# Patient Record
Sex: FEMALE | Race: BLACK OR AFRICAN AMERICAN | Employment: UNEMPLOYED | URBAN - METROPOLITAN AREA
[De-identification: names, ages, dates, MRNs, and addresses within clinical notes are randomized per-mention and may not be internally consistent; named-entity substitution may affect disease eponyms.]

---

## 2020-05-18 ENCOUNTER — OFFICE VISIT (OUTPATIENT)
Dept: PRIMARY CARE CLINIC | Age: 30
End: 2020-05-18

## 2020-05-18 PROCEDURE — 99213 OFFICE O/P EST LOW 20 MIN: CPT | Performed by: NURSE PRACTITIONER

## 2020-05-19 LAB
SARS-COV-2: NOT DETECTED
SOURCE: NORMAL

## 2020-06-09 ENCOUNTER — OFFICE VISIT (OUTPATIENT)
Dept: PRIMARY CARE CLINIC | Age: 30
End: 2020-06-09

## 2020-06-09 PROCEDURE — 99213 OFFICE O/P EST LOW 20 MIN: CPT | Performed by: NURSE PRACTITIONER

## 2020-06-11 LAB
SARS-COV-2: NOT DETECTED
SOURCE: NORMAL

## 2020-08-26 LAB
C. TRACHOMATIS, EXTERNAL RESULT: NEGATIVE
HEP B, EXTERNAL RESULT: NEGATIVE
HEPATITIS C ANTIBODY, EXTERNAL RESULT: NEGATIVE
HIV, EXTERNAL RESULT: NEGATIVE
N. GONORRHOEAE, EXTERNAL RESULT: NEGATIVE
RUBELLA TITER, EXTERNAL RESULT: NORMAL

## 2021-03-29 LAB — GBS, EXTERNAL RESULT: NEGATIVE

## 2021-04-26 ENCOUNTER — ANESTHESIA (OUTPATIENT)
Dept: LABOR AND DELIVERY | Age: 31
End: 2021-04-26
Payer: COMMERCIAL

## 2021-04-26 ENCOUNTER — HOSPITAL ENCOUNTER (INPATIENT)
Age: 31
LOS: 3 days | Discharge: HOME | End: 2021-04-29
Attending: ADVANCED PRACTICE MIDWIFE | Admitting: ADVANCED PRACTICE MIDWIFE
Payer: COMMERCIAL

## 2021-04-26 ENCOUNTER — ANESTHESIA EVENT (OUTPATIENT)
Dept: LABOR AND DELIVERY | Age: 31
End: 2021-04-26
Payer: COMMERCIAL

## 2021-04-26 PROBLEM — Z34.90 ENCOUNTER FOR INDUCTION OF LABOR: Status: ACTIVE | Noted: 2021-04-26

## 2021-04-26 LAB
ABO/RH: NORMAL
ALBUMIN SERPL-MCNC: 3.5 G/DL (ref 3.4–5)
ALP BLD-CCNC: 340 U/L (ref 40–129)
ALT SERPL-CCNC: 23 U/L (ref 10–40)
AMPHETAMINE SCREEN, URINE: NORMAL
ANTIBODY SCREEN: NORMAL
AST SERPL-CCNC: 26 U/L (ref 15–37)
BARBITURATE SCREEN URINE: NORMAL
BASOPHILS ABSOLUTE: 0 K/UL (ref 0–0.2)
BASOPHILS RELATIVE PERCENT: 0.4 %
BENZODIAZEPINE SCREEN, URINE: NORMAL
BILIRUB SERPL-MCNC: 0.5 MG/DL (ref 0–1)
BILIRUBIN DIRECT: <0.2 MG/DL (ref 0–0.3)
BILIRUBIN, INDIRECT: ABNORMAL MG/DL (ref 0–1)
BUPRENORPHINE URINE: NORMAL
CANNABINOID SCREEN URINE: NORMAL
COCAINE METABOLITE SCREEN URINE: NORMAL
EOSINOPHILS ABSOLUTE: 0.1 K/UL (ref 0–0.6)
EOSINOPHILS RELATIVE PERCENT: 1 %
HCT VFR BLD CALC: 35.4 % (ref 36–48)
HEMOGLOBIN: 11.7 G/DL (ref 12–16)
LYMPHOCYTES ABSOLUTE: 1.6 K/UL (ref 1–5.1)
LYMPHOCYTES RELATIVE PERCENT: 15.8 %
Lab: NORMAL
MCH RBC QN AUTO: 27.3 PG (ref 26–34)
MCHC RBC AUTO-ENTMCNC: 33.1 G/DL (ref 31–36)
MCV RBC AUTO: 82.3 FL (ref 80–100)
METHADONE SCREEN, URINE: NORMAL
MONOCYTES ABSOLUTE: 0.8 K/UL (ref 0–1.3)
MONOCYTES RELATIVE PERCENT: 7.4 %
NEUTROPHILS ABSOLUTE: 7.8 K/UL (ref 1.7–7.7)
NEUTROPHILS RELATIVE PERCENT: 75.4 %
OPIATE SCREEN URINE: NORMAL
OXYCODONE URINE: NORMAL
PDW BLD-RTO: 14 % (ref 12.4–15.4)
PH UA: 6
PHENCYCLIDINE SCREEN URINE: NORMAL
PLATELET # BLD: 176 K/UL (ref 135–450)
PLATELET SLIDE REVIEW: ADEQUATE
PMV BLD AUTO: 9.6 FL (ref 5–10.5)
PROPOXYPHENE SCREEN: NORMAL
RBC # BLD: 4.31 M/UL (ref 4–5.2)
SARS-COV-2, NAAT: NOT DETECTED
SLIDE REVIEW: ABNORMAL
TOTAL PROTEIN: 7.2 G/DL (ref 6.4–8.2)
TOTAL SYPHILLIS IGG/IGM: NORMAL
WBC # BLD: 10.3 K/UL (ref 4–11)

## 2021-04-26 PROCEDURE — 85025 COMPLETE CBC W/AUTO DIFF WBC: CPT

## 2021-04-26 PROCEDURE — 2500000003 HC RX 250 WO HCPCS: Performed by: NURSE ANESTHETIST, CERTIFIED REGISTERED

## 2021-04-26 PROCEDURE — 80307 DRUG TEST PRSMV CHEM ANLYZR: CPT

## 2021-04-26 PROCEDURE — 3700000025 EPIDURAL BLOCK: Performed by: NURSE ANESTHETIST, CERTIFIED REGISTERED

## 2021-04-26 PROCEDURE — 87635 SARS-COV-2 COVID-19 AMP PRB: CPT

## 2021-04-26 PROCEDURE — 86900 BLOOD TYPING SEROLOGIC ABO: CPT

## 2021-04-26 PROCEDURE — 6360000002 HC RX W HCPCS

## 2021-04-26 PROCEDURE — 2580000003 HC RX 258: Performed by: ADVANCED PRACTICE MIDWIFE

## 2021-04-26 PROCEDURE — 86780 TREPONEMA PALLIDUM: CPT

## 2021-04-26 PROCEDURE — 2500000003 HC RX 250 WO HCPCS: Performed by: ANESTHESIOLOGY

## 2021-04-26 PROCEDURE — 86850 RBC ANTIBODY SCREEN: CPT

## 2021-04-26 PROCEDURE — 86901 BLOOD TYPING SEROLOGIC RH(D): CPT

## 2021-04-26 PROCEDURE — 80076 HEPATIC FUNCTION PANEL: CPT

## 2021-04-26 PROCEDURE — 3700000025 EPIDURAL BLOCK: Performed by: ANESTHESIOLOGY

## 2021-04-26 PROCEDURE — 59025 FETAL NON-STRESS TEST: CPT

## 2021-04-26 PROCEDURE — 1220000000 HC SEMI PRIVATE OB R&B

## 2021-04-26 RX ORDER — NALOXONE HYDROCHLORIDE 0.4 MG/ML
0.4 INJECTION, SOLUTION INTRAMUSCULAR; INTRAVENOUS; SUBCUTANEOUS PRN
Status: DISCONTINUED | OUTPATIENT
Start: 2021-04-26 | End: 2021-04-27

## 2021-04-26 RX ORDER — ONDANSETRON 2 MG/ML
4 INJECTION INTRAMUSCULAR; INTRAVENOUS EVERY 6 HOURS PRN
Status: DISCONTINUED | OUTPATIENT
Start: 2021-04-26 | End: 2021-04-27

## 2021-04-26 RX ORDER — SODIUM CHLORIDE 0.9 % (FLUSH) 0.9 %
10 SYRINGE (ML) INJECTION PRN
Status: DISCONTINUED | OUTPATIENT
Start: 2021-04-26 | End: 2021-04-27

## 2021-04-26 RX ORDER — SODIUM CHLORIDE 0.9 % (FLUSH) 0.9 %
10 SYRINGE (ML) INJECTION EVERY 12 HOURS SCHEDULED
Status: DISCONTINUED | OUTPATIENT
Start: 2021-04-26 | End: 2021-04-27

## 2021-04-26 RX ORDER — LIDOCAINE HYDROCHLORIDE AND EPINEPHRINE 15; 5 MG/ML; UG/ML
INJECTION, SOLUTION EPIDURAL PRN
Status: DISCONTINUED | OUTPATIENT
Start: 2021-04-26 | End: 2021-04-27 | Stop reason: SDUPTHER

## 2021-04-26 RX ORDER — SODIUM CHLORIDE, SODIUM LACTATE, POTASSIUM CHLORIDE, CALCIUM CHLORIDE 600; 310; 30; 20 MG/100ML; MG/100ML; MG/100ML; MG/100ML
INJECTION, SOLUTION INTRAVENOUS CONTINUOUS
Status: DISCONTINUED | OUTPATIENT
Start: 2021-04-26 | End: 2021-04-27

## 2021-04-26 RX ORDER — NALBUPHINE HCL 10 MG/ML
5 AMPUL (ML) INJECTION EVERY 4 HOURS PRN
Status: DISCONTINUED | OUTPATIENT
Start: 2021-04-26 | End: 2021-04-27

## 2021-04-26 RX ORDER — SODIUM CHLORIDE 9 MG/ML
25 INJECTION, SOLUTION INTRAVENOUS PRN
Status: DISCONTINUED | OUTPATIENT
Start: 2021-04-26 | End: 2021-04-27

## 2021-04-26 RX ORDER — DOCUSATE SODIUM 100 MG/1
100 CAPSULE, LIQUID FILLED ORAL 2 TIMES DAILY
Status: DISCONTINUED | OUTPATIENT
Start: 2021-04-26 | End: 2021-04-27

## 2021-04-26 RX ADMIN — LIDOCAINE HYDROCHLORIDE AND EPINEPHRINE 3 ML: 15; 5 INJECTION, SOLUTION EPIDURAL at 23:26

## 2021-04-26 RX ADMIN — Medication 30 UNITS: at 15:00

## 2021-04-26 RX ADMIN — Medication 4 ML: at 23:31

## 2021-04-26 RX ADMIN — Medication 4 ML: at 23:36

## 2021-04-26 RX ADMIN — SODIUM CHLORIDE, POTASSIUM CHLORIDE, SODIUM LACTATE AND CALCIUM CHLORIDE: 600; 310; 30; 20 INJECTION, SOLUTION INTRAVENOUS at 15:00

## 2021-04-26 RX ADMIN — Medication 10 ML/HR: at 23:41

## 2021-04-26 ASSESSMENT — PAIN DESCRIPTION - DESCRIPTORS: DESCRIPTORS: CRAMPING

## 2021-04-26 ASSESSMENT — ENCOUNTER SYMPTOMS: SHORTNESS OF BREATH: 0

## 2021-04-26 NOTE — ANESTHESIA PRE PROCEDURE
Department of Anesthesiology  Preprocedure Note       Name:  Yana Umana   Age:  31 y.o.  :  1990                                          MRN:  [id number]         Date:  2021        Procedure: Labor Epidural    Medications prior to admission:   Prior to Admission medications    Medication Sig Start Date End Date Taking?  Authorizing Provider   Prenatal Vit-Fe Fumarate-FA (PRENATAL VITAMIN PO) Take 1 tablet by mouth daily   Yes Historical Provider, MD   PEPPERMINT OIL PO Take 1 drop by mouth as needed (nausea)   Yes Historical Provider, MD       Current medications:    Current Facility-Administered Medications   Medication Dose Route Frequency Provider Last Rate Last Admin    lactated ringers infusion   Intravenous Continuous Janora Piggs, APRN -  mL/hr at 21 1500 New Bag at 21 1500    sodium chloride flush 0.9 % injection 10 mL  10 mL Intravenous 2 times per day Janora Piggs, APRN - CNM        sodium chloride flush 0.9 % injection 10 mL  10 mL Intravenous PRN Alexia Schwantes, APRN - CNM        0.9 % sodium chloride infusion  25 mL Intravenous PRN Alexia Schwantes, APRN - CNM        oxytocin (PITOCIN) 30 units in 500 mL infusion  10 Units Intravenous PRN Alexia Schwantes, APRN - CNM        And    oxytocin (PITOCIN) 30 units in 500 mL infusion  87.3 candy-units/min Intravenous Continuous PRN Alexia Schwantes, APRN - CNM        ondansetron (ZOFRAN) injection 4 mg  4 mg Intravenous Q6H PRN Alexia Schwantes, APRN - CNM        docusate sodium (COLACE) capsule 100 mg  100 mg Oral BID Alexia Schwantes, APRN - CNM        oxytocin (PITOCIN) 30 units in 500 mL infusion  1-20 candy-units/min Intravenous Continuous Alexia Schwantes, APRN - CNM 5 mL/hr at 21 1730 5 candy-units/min at 21 1730       Allergies:  No Known Allergies    Problem List:    Patient Active Problem List   Diagnosis Code    Encounter for induction of labor Z34.90       Past Medical History:        Diagnosis Date    Uterine fibroid        Past Surgical History:  History reviewed. No pertinent surgical history.     Social History:    Social History     Tobacco Use    Smoking status: Never Smoker    Smokeless tobacco: Never Used   Substance Use Topics    Alcohol use: Not Currently                                Counseling given: Not Answered      Vital Signs (Current):   Vitals:    04/26/21 1250 04/26/21 1339   BP: 118/75 120/77   Pulse: 90 88                                              BP Readings from Last 3 Encounters:   04/26/21 120/77       NPO Status:                                                                                 BMI:   Wt Readings from Last 3 Encounters:   No data found for Wt     There is no height or weight on file to calculate BMI.    CBC:   Lab Results   Component Value Date    WBC 10.3 04/26/2021    RBC 4.31 04/26/2021    HGB 11.7 04/26/2021    HCT 35.4 04/26/2021    MCV 82.3 04/26/2021    RDW 14.0 04/26/2021     04/26/2021       CMP:   Lab Results   Component Value Date    PROT 7.2 04/26/2021    BILITOT 0.5 04/26/2021    ALKPHOS 340 04/26/2021    AST 26 04/26/2021    ALT 23 04/26/2021       COVID-19 Screening (If Applicable):   Lab Results   Component Value Date    COVID19 Not Detected 06/09/2020           Anesthesia Evaluation  Patient summary reviewed and Nursing notes reviewed no history of anesthetic complications:   Airway: Mallampati: II  TM distance: >3 FB   Neck ROM: full  Mouth opening: > = 3 FB Dental: normal exam         Pulmonary:Negative Pulmonary ROS and normal exam  breath sounds clear to auscultation      (-) asthma, shortness of breath and recent URI                           Cardiovascular:Negative CV ROS  Exercise tolerance: good (>4 METS),       (-) hypertension and CAD      Rhythm: regular  Rate: normal                    Neuro/Psych:   Negative Neuro/Psych ROS              GI/Hepatic/Renal: Neg GI/Hepatic/Renal ROS  (+) GERD (TUMS PRN): well controlled,      (-) liver disease and no renal disease       Endo/Other: Negative Endo/Other ROS       (-) diabetes mellitus, hypothyroidism, hyperthyroidism                ROS comment: Uterine fibroids per chart review. Abdominal:           Vascular: negative vascular ROS. - DVT and PE. Anesthesia Plan      epidural     ASA 2     (Plan for a labor epidural. Plan and risks discussed with patient including but not limited to changes in HR, B/P and oxygen status; N/V; infection; headache; inadequate block or need to replace epidural. Questions answered. Patient agrees to POC.       )        Anesthetic plan and risks discussed with patient and spouse. OB History        1    Para        Term                AB        Living           SAB        TAB        Ectopic        Molar        Multiple        Live Births                    Karis Snyder is a 31y.o. year-old female admitted to [facility],  for IOL. Gestational age is 44w3d. She was seen, examined and her chart was reviewed (including anesthesia, drug and allergy history). No interval changes are noted to her history and physical examination. (except as noted above).     Risks/benefits/alternatives of both neuraxial and general anesthesia were discussed and she agrees to proceed at the direction of the care team.    CRAIG Rahman CRNA  2021  5:56 PM        CRAIG Rahman CRNA   2021

## 2021-04-26 NOTE — FLOWSHEET NOTE
Bedside report given to Ramona Avery RN. She is assuming care at this time. Pitocin infusion verified at 6mU/min.

## 2021-04-26 NOTE — H&P
Department of Obstetrics and Gynecology   Obstetrics History and Physical        CHIEF COMPLAINT:  abdominal pain, contractions    HISTORY OF PRESENT ILLNESS:    Yana Umana  is a 31 y.o.  female at 40w3d presents with a chief complaint as above and is being admitted for induction. She began 48hrs ago with RUQ pain that has come and gone. Worsens with physical activity and after eating. Pain has subsided throughout the day today. She also reports contractions daily that come and go. She denies N/V, headache, fever. Reports good fetal movement. Denies VB, LOF. Estimated Due Date: Estimated Date of Delivery: 21    PRENATAL CARE: Complicated by: none    PAST OB HISTORY:  OB History        1    Para        Term                AB        Living           SAB        TAB        Ectopic        Molar        Multiple        Live Births                  Past Medical History:        Diagnosis Date    Uterine fibroid      Past Surgical History:    History reviewed. No pertinent surgical history. Allergies:  Patient has no known allergies.   Social History:    Social History     Socioeconomic History    Marital status:      Spouse name: Not on file    Number of children: Not on file    Years of education: Not on file    Highest education level: Not on file   Occupational History    Not on file   Social Needs    Financial resource strain: Not on file    Food insecurity     Worry: Not on file     Inability: Not on file   [employer] needs     Medical: Not on file     Non-medical: Not on file   Tobacco Use    Smoking status: Never Smoker    Smokeless tobacco: Never Used   Substance and Sexual Activity    Alcohol use: Not Currently    Drug use: Never    Sexual activity: Yes     Partners: Male   Lifestyle    Physical activity     Days per week: Not on file     Minutes per session: Not on file    Stress: Not on file   Relationships    Social connections     Talks on phone: Not on file     Gets together: Not on file     Attends Sikhism service: Not on file     Active member of club or organization: Not on file     Attends meetings of clubs or organizations: Not on file     Relationship status: Not on file    Intimate partner violence     Fear of current or ex partner: Not on file     Emotionally abused: Not on file     Physically abused: Not on file     Forced sexual activity: Not on file   Other Topics Concern    Not on file   Social History Narrative    Not on file     Family History:       Problem Relation Age of Onset    Other Mother         Hypotension    Other Father         Car Accident    Other Sister         Uterine Fibroids     Medications Prior to Admission:  Medications Prior to Admission: Prenatal Vit-Fe Fumarate-FA (PRENATAL VITAMIN PO), Take 1 tablet by mouth daily  PEPPERMINT OIL PO, Take 1 drop by mouth as needed (nausea)    REVIEW OF SYSTEMS:  negative     PHYSICAL EXAM:    There were no vitals filed for this visit. General appearance:  awake, alert, cooperative, no apparent distress, and appears stated age  Neurologic:  Awake, alert, oriented to name, place and time.     Lungs:  No increased work of breathing, good air exchange  Abdomen:  Soft, non tender, gravid, fundal height consistent with the gestational age, EFW by Leopold's maneuvers is 3500 grams  Pelvis:  Adequate pelvis  Cervix: 1/60/-2  Contraction frequency: q3-6 min  Membranes:  Intact  Labs:   CBC:   Lab Results   Component Value Date    WBC 10.3 04/26/2021    RBC 4.31 04/26/2021    HGB 11.7 04/26/2021    HCT 35.4 04/26/2021    MCV 82.3 04/26/2021    MCH 27.3 04/26/2021    MCHC 33.1 04/26/2021    RDW 14.0 04/26/2021     04/26/2021    MPV 9.6 04/26/2021       ASSESSMENT:  <principal problem not specified>    PLAN: pitocin, Admit  Labor: Routine labor orders  Fetus: Reassuring  GBS: Negative    Electronically signed by CRAIG Duff CNM on 4/26/2021 at 3:01 PM

## 2021-04-26 NOTE — PROGRESS NOTES
Department of Obstetrics and Gynecology  Triage Evaluation Note    SUBJECTIVE:  Laila Ramos is a 31 y.o.,  at 40w3d who presents for RUQ pain for 48 hours. The pain is a burning sensation that radiates around to her back. Worsens with physical activity and after eating. She denies vaginal bleeding, leakage of fluid. Reports irregular contractions daily for the past week. She states the baby is moving well. She denies fever, shortness of breath, palpitations, nausea, vomiting, diarrhea on ROS. I personally reviewed the past medical and surgical histories, as well as the problem list.    OBJECTIVE:  Vital Signs wnl  Appearance/Psychiatric: alert and oriented X3  Constitutional: Appears well, no distress  Cardiovascular: She does not have edema. Respiratory:Respiratory effort is normal.  Gastrointestinal: soft, non tender, Gravid. The uterine size is equal to dates. Pelvic: Cervix 1/60/-2.    NST reactive and reassuring  [place]: contractions q 3-6 min    LABS:   Reviewed   WNL with exception of elevated alk phos    ASSESSMENT AND PLAN:  31 y.o.  at 44w3d presents with RUQ pain and contractions  Desires IOL   Re-evaluate pain after delivery and repeat labs to rule out gall bladder issues    Plan and labs reviewed with Dr. Mark Salazar         Electronically signed by CRAIG Trammell CNM on 2021 at 2:47 PM

## 2021-04-26 NOTE — FLOWSHEET NOTE
Patient admitted to triage with complain of RUQ abd pain. She reports that it started yesterday, it has been intermittent, it got worse overnight but has improved this morning. She states that it was almost gone but then she ate lunch and it came back again. She reports no visual disturbances, not nausea/vomiting, no headache, no other new symptoms. She is feeling the baby move. She reports no vaginal bleeding and no fluid leaking. FHT initiated, VSS.  at bedside, call light within reach. RN remains at bedside.

## 2021-04-27 ENCOUNTER — APPOINTMENT (OUTPATIENT)
Dept: CT IMAGING | Age: 31
End: 2021-04-27
Payer: COMMERCIAL

## 2021-04-27 LAB
HCT VFR BLD CALC: 26.7 % (ref 36–48)
HEMOGLOBIN: 8.7 G/DL (ref 12–16)
MCH RBC QN AUTO: 26.4 PG (ref 26–34)
MCHC RBC AUTO-ENTMCNC: 32.5 G/DL (ref 31–36)
MCV RBC AUTO: 81.3 FL (ref 80–100)
PDW BLD-RTO: 14.3 % (ref 12.4–15.4)
PLATELET # BLD: 143 K/UL (ref 135–450)
PMV BLD AUTO: 9.8 FL (ref 5–10.5)
RBC # BLD: 3.29 M/UL (ref 4–5.2)
WBC # BLD: 17.9 K/UL (ref 4–11)

## 2021-04-27 PROCEDURE — 85027 COMPLETE CBC AUTOMATED: CPT

## 2021-04-27 PROCEDURE — 0UQMXZZ REPAIR VULVA, EXTERNAL APPROACH: ICD-10-PCS | Performed by: OBSTETRICS & GYNECOLOGY

## 2021-04-27 PROCEDURE — 2580000003 HC RX 258: Performed by: ADVANCED PRACTICE MIDWIFE

## 2021-04-27 PROCEDURE — 6370000000 HC RX 637 (ALT 250 FOR IP): Performed by: OBSTETRICS & GYNECOLOGY

## 2021-04-27 PROCEDURE — 2500000003 HC RX 250 WO HCPCS: Performed by: NURSE ANESTHETIST, CERTIFIED REGISTERED

## 2021-04-27 PROCEDURE — 59025 FETAL NON-STRESS TEST: CPT

## 2021-04-27 PROCEDURE — 0DQR0ZZ REPAIR ANAL SPHINCTER, OPEN APPROACH: ICD-10-PCS | Performed by: OBSTETRICS & GYNECOLOGY

## 2021-04-27 PROCEDURE — 6370000000 HC RX 637 (ALT 250 FOR IP): Performed by: ADVANCED PRACTICE MIDWIFE

## 2021-04-27 PROCEDURE — 96375 TX/PRO/DX INJ NEW DRUG ADDON: CPT

## 2021-04-27 PROCEDURE — 6360000004 HC RX CONTRAST MEDICATION: Performed by: ADVANCED PRACTICE MIDWIFE

## 2021-04-27 PROCEDURE — 72193 CT PELVIS W/DYE: CPT

## 2021-04-27 PROCEDURE — 2580000003 HC RX 258: Performed by: OBSTETRICS & GYNECOLOGY

## 2021-04-27 PROCEDURE — 7200000001 HC VAGINAL DELIVERY

## 2021-04-27 PROCEDURE — 6360000002 HC RX W HCPCS: Performed by: ADVANCED PRACTICE MIDWIFE

## 2021-04-27 PROCEDURE — 1220000000 HC SEMI PRIVATE OB R&B

## 2021-04-27 PROCEDURE — 96374 THER/PROPH/DIAG INJ IV PUSH: CPT

## 2021-04-27 PROCEDURE — 6360000002 HC RX W HCPCS

## 2021-04-27 PROCEDURE — 2500000003 HC RX 250 WO HCPCS: Performed by: ANESTHESIOLOGY

## 2021-04-27 PROCEDURE — 51701 INSERT BLADDER CATHETER: CPT

## 2021-04-27 RX ORDER — CALCIUM CARBONATE 200(500)MG
1000 TABLET,CHEWABLE ORAL 3 TIMES DAILY PRN
Status: DISCONTINUED | OUTPATIENT
Start: 2021-04-27 | End: 2021-04-27

## 2021-04-27 RX ORDER — SODIUM CHLORIDE 9 MG/ML
25 INJECTION, SOLUTION INTRAVENOUS PRN
Status: DISCONTINUED | OUTPATIENT
Start: 2021-04-27 | End: 2021-04-29 | Stop reason: HOSPADM

## 2021-04-27 RX ORDER — ACETAMINOPHEN 500 MG
1000 TABLET ORAL ONCE
Status: DISCONTINUED | OUTPATIENT
Start: 2021-04-27 | End: 2021-04-27

## 2021-04-27 RX ORDER — POLYETHYLENE GLYCOL 3350 17 G/17G
17 POWDER, FOR SOLUTION ORAL DAILY
Status: DISCONTINUED | OUTPATIENT
Start: 2021-04-27 | End: 2021-04-29 | Stop reason: HOSPADM

## 2021-04-27 RX ORDER — SODIUM CHLORIDE 0.9 % (FLUSH) 0.9 %
5-40 SYRINGE (ML) INJECTION PRN
Status: DISCONTINUED | OUTPATIENT
Start: 2021-04-27 | End: 2021-04-29 | Stop reason: HOSPADM

## 2021-04-27 RX ORDER — LANOLIN 100 %
OINTMENT (GRAM) TOPICAL PRN
Status: DISCONTINUED | OUTPATIENT
Start: 2021-04-27 | End: 2021-04-29 | Stop reason: HOSPADM

## 2021-04-27 RX ORDER — ACETAMINOPHEN 325 MG/1
650 TABLET ORAL EVERY 4 HOURS PRN
Status: DISCONTINUED | OUTPATIENT
Start: 2021-04-27 | End: 2021-04-29 | Stop reason: HOSPADM

## 2021-04-27 RX ORDER — OXYCODONE HYDROCHLORIDE 5 MG/1
5 TABLET ORAL EVERY 6 HOURS PRN
Status: DISCONTINUED | OUTPATIENT
Start: 2021-04-27 | End: 2021-04-29 | Stop reason: HOSPADM

## 2021-04-27 RX ORDER — LIDOCAINE HYDROCHLORIDE AND EPINEPHRINE 20; 5 MG/ML; UG/ML
INJECTION, SOLUTION EPIDURAL; INFILTRATION; INTRACAUDAL; PERINEURAL PRN
Status: DISCONTINUED | OUTPATIENT
Start: 2021-04-27 | End: 2021-04-27 | Stop reason: SDUPTHER

## 2021-04-27 RX ORDER — IBUPROFEN 800 MG/1
800 TABLET ORAL EVERY 8 HOURS
Status: DISCONTINUED | OUTPATIENT
Start: 2021-04-28 | End: 2021-04-27

## 2021-04-27 RX ORDER — OXYCODONE HYDROCHLORIDE 5 MG/1
5 TABLET ORAL ONCE
Status: COMPLETED | OUTPATIENT
Start: 2021-04-27 | End: 2021-04-27

## 2021-04-27 RX ORDER — ONDANSETRON 4 MG/1
8 TABLET, ORALLY DISINTEGRATING ORAL EVERY 8 HOURS PRN
Status: DISCONTINUED | OUTPATIENT
Start: 2021-04-27 | End: 2021-04-29 | Stop reason: HOSPADM

## 2021-04-27 RX ORDER — SIMETHICONE 80 MG
80 TABLET,CHEWABLE ORAL EVERY 6 HOURS PRN
Status: DISCONTINUED | OUTPATIENT
Start: 2021-04-27 | End: 2021-04-29 | Stop reason: HOSPADM

## 2021-04-27 RX ORDER — SODIUM CHLORIDE 0.9 % (FLUSH) 0.9 %
5-40 SYRINGE (ML) INJECTION EVERY 12 HOURS SCHEDULED
Status: DISCONTINUED | OUTPATIENT
Start: 2021-04-27 | End: 2021-04-29 | Stop reason: HOSPADM

## 2021-04-27 RX ORDER — SODIUM CHLORIDE, SODIUM LACTATE, POTASSIUM CHLORIDE, CALCIUM CHLORIDE 600; 310; 30; 20 MG/100ML; MG/100ML; MG/100ML; MG/100ML
INJECTION, SOLUTION INTRAVENOUS CONTINUOUS
Status: DISCONTINUED | OUTPATIENT
Start: 2021-04-27 | End: 2021-04-29 | Stop reason: HOSPADM

## 2021-04-27 RX ORDER — ACETAMINOPHEN 325 MG/1
975 TABLET ORAL ONCE
Status: COMPLETED | OUTPATIENT
Start: 2021-04-27 | End: 2021-04-27

## 2021-04-27 RX ORDER — FAMOTIDINE 20 MG/1
20 TABLET, FILM COATED ORAL 2 TIMES DAILY
Status: DISCONTINUED | OUTPATIENT
Start: 2021-04-27 | End: 2021-04-29 | Stop reason: HOSPADM

## 2021-04-27 RX ORDER — IBUPROFEN 800 MG/1
800 TABLET ORAL EVERY 8 HOURS PRN
Status: DISCONTINUED | OUTPATIENT
Start: 2021-04-27 | End: 2021-04-29 | Stop reason: HOSPADM

## 2021-04-27 RX ORDER — SENNA AND DOCUSATE SODIUM 50; 8.6 MG/1; MG/1
2 TABLET, FILM COATED ORAL DAILY PRN
Status: DISCONTINUED | OUTPATIENT
Start: 2021-04-27 | End: 2021-04-28

## 2021-04-27 RX ADMIN — CEFAZOLIN SODIUM 2000 MG: 10 INJECTION, POWDER, FOR SOLUTION INTRAVENOUS at 15:44

## 2021-04-27 RX ADMIN — ANTACID TABLETS 1000 MG: 500 TABLET, CHEWABLE ORAL at 06:12

## 2021-04-27 RX ADMIN — SODIUM CHLORIDE, POTASSIUM CHLORIDE, SODIUM LACTATE AND CALCIUM CHLORIDE: 600; 310; 30; 20 INJECTION, SOLUTION INTRAVENOUS at 12:12

## 2021-04-27 RX ADMIN — OXYCODONE HYDROCHLORIDE 5 MG: 5 TABLET ORAL at 17:47

## 2021-04-27 RX ADMIN — Medication 10 ML: at 23:08

## 2021-04-27 RX ADMIN — OXYCODONE 5 MG: 5 TABLET ORAL at 23:07

## 2021-04-27 RX ADMIN — ACETAMINOPHEN 975 MG: 325 TABLET ORAL at 17:48

## 2021-04-27 RX ADMIN — LIDOCAINE HYDROCHLORIDE,EPINEPHRINE BITARTRATE 3 ML: 20; .005 INJECTION, SOLUTION EPIDURAL; INFILTRATION; INTRACAUDAL; PERINEURAL at 11:25

## 2021-04-27 RX ADMIN — IBUPROFEN 800 MG: 800 TABLET, FILM COATED ORAL at 15:49

## 2021-04-27 RX ADMIN — ONDANSETRON 4 MG: 2 INJECTION INTRAMUSCULAR; INTRAVENOUS at 10:25

## 2021-04-27 RX ADMIN — IOPAMIDOL 75 ML: 755 INJECTION, SOLUTION INTRAVENOUS at 19:52

## 2021-04-27 RX ADMIN — IBUPROFEN 800 MG: 800 TABLET, FILM COATED ORAL at 23:47

## 2021-04-27 RX ADMIN — LIDOCAINE HYDROCHLORIDE,EPINEPHRINE BITARTRATE 5 ML: 20; .005 INJECTION, SOLUTION EPIDURAL; INFILTRATION; INTRACAUDAL; PERINEURAL at 12:53

## 2021-04-27 RX ADMIN — SODIUM CHLORIDE, POTASSIUM CHLORIDE, SODIUM LACTATE AND CALCIUM CHLORIDE: 600; 310; 30; 20 INJECTION, SOLUTION INTRAVENOUS at 04:09

## 2021-04-27 RX ADMIN — OXYCODONE 5 MG: 5 TABLET ORAL at 16:40

## 2021-04-27 ASSESSMENT — PAIN DESCRIPTION - PAIN TYPE
TYPE: ACUTE PAIN
TYPE: ACUTE PAIN

## 2021-04-27 ASSESSMENT — PAIN SCALES - GENERAL
PAINLEVEL_OUTOF10: 7
PAINLEVEL_OUTOF10: 5
PAINLEVEL_OUTOF10: 9

## 2021-04-27 ASSESSMENT — PAIN DESCRIPTION - FREQUENCY
FREQUENCY: INTERMITTENT
FREQUENCY: CONTINUOUS

## 2021-04-27 ASSESSMENT — PAIN - FUNCTIONAL ASSESSMENT
PAIN_FUNCTIONAL_ASSESSMENT: ACTIVITIES ARE NOT PREVENTED
PAIN_FUNCTIONAL_ASSESSMENT: PREVENTS OR INTERFERES SOME ACTIVE ACTIVITIES AND ADLS

## 2021-04-27 ASSESSMENT — PAIN DESCRIPTION - ORIENTATION
ORIENTATION: LOWER
ORIENTATION: MID

## 2021-04-27 ASSESSMENT — PAIN DESCRIPTION - PROGRESSION
CLINICAL_PROGRESSION: NOT CHANGED
CLINICAL_PROGRESSION: NOT CHANGED

## 2021-04-27 ASSESSMENT — PAIN DESCRIPTION - ONSET
ONSET: ON-GOING
ONSET: ON-GOING

## 2021-04-27 ASSESSMENT — PAIN DESCRIPTION - LOCATION
LOCATION: PERINEUM
LOCATION: PERINEUM

## 2021-04-27 ASSESSMENT — PAIN DESCRIPTION - DESCRIPTORS
DESCRIPTORS: ACHING;SORE
DESCRIPTORS: ACHING;SORE

## 2021-04-27 NOTE — PROGRESS NOTES
S: Pt resting comfortably in bed. Report received from Reginold Bumpers, [place].    O: VSS, /75   Pulse 93   Temp 98 °F (36.7 °C) (Oral)   Resp 16         , moderate variability, no accels, no decels        SVE C/C/+2        UCs q 2 minutes, pit @ 10 mius   A: IOL       FHR Cat I   P: Prepare for pushing       Dr. Tigist Rockwell aware

## 2021-04-27 NOTE — PROGRESS NOTES
Patient complaining of perineal pain, rated at a 7-8 on pain scale. Received ibuprofen and Megan 5mg earlier without relief. Recently received Tylenol 1g and an additional Megan 5mg. On exam, repair intact. Perineum with mild edema. This may represent routine pain s/p third degree perineal laceration; however, with placement of forceps earlier and lacerations, hematoma considered. STAT CBC ordered.      Faustino Poot

## 2021-04-27 NOTE — FLOWSHEET NOTE
Pt called out, \"think my water broke\". Nini Dudley CNM at bedside to evaluate pt. Moderate amount of clear fluid noted on pad.  SVE 2-3/80/0

## 2021-04-27 NOTE — FLOWSHEET NOTE
Patient reports small improvement in pain and it is noted that she is longer shivering. BP rechecked in [place]. Notified Dr. Marisol Garzon of repeat BP of 134/72. Telephone order received to notify [facility] provider for two consecutive BP's of 160/110 or greater.

## 2021-04-27 NOTE — PROGRESS NOTES
Department of Obstetrics and Gynecology  Intrapartum Evaluation Note    Subjective:  Resting in bed, comfortable with epidural. Pit at 10mius. Repositioned to right lateral with peanut ball. Objective:  /60   Pulse 86   Temp 98.4 °F (36.9 °C) (Oral)   Resp 14   Cervix is 7/90/0   FHR tracing description: 120/mod/+accels, occasional early decels  FHR tracing category 2  [place]: contractions every 1.5-3.5 minutes      Assessment and Plan:   Active labor    Labor Plan: Continue pitocin titration   Anticipate         Electronically signed by CRAIG Trammell CNM on 2021 at 6:48 AM

## 2021-04-27 NOTE — L&D DELIVERY NOTE
VACUUM ASSISTED VAGINAL DELIVERY NOTE    Pre operative Diagnoses / Indications:   1. IUP at 40w4d  2. Induction of labor at term  3. Maternal exhaustion  Post operative Diagnoses: Same  Procedure: Vacuum-assisted vaginal delivery  Anesthesia: Epidural  Surgeon: Hailey Caldera MD  Assistant: ROSEY Enriquez  Position: OA  Station: +4/5  Living, viable male infant; weight 4570g; Apgars 4/8/9  Consent: The risks, benefits and alternatives were discussed with the patient including but not limited to maternal lacerations and fetal injury, and the patient agreed to procedure. Complications: shoulder dystocia lasting 2 minutes  Specimens: none  Gases sent: yes  QBL: 300 mL  Optimal cord clamping: no    Aicha Williamson is a 31 y.o.  at 40w4d who was admitted for induction of labor. She progressed to 10 cm and pushing commenced. After two hours of pushing, she had moved the baby to +4 station but reported extreme exhaustion. I was called to room by Thelma Woodson CNM, to assess for operative assisted vaginal delivery. On exam, cervix was complete/+4, MIKAELA, EFW: 4000g. The risks and benefits of forceps assisted vaginal delivery were discussed with the patient and she agreed to proceed with the planned procedure. We also discussed episiotomy because I was concerned she may require one due to thick, swollen perineal tissue encountered on her exam; she agreed to the procedure. The bladder was emptied and first blade placed. I attempted to place the second blade but was unable to articulate the blades. I removed both forceps and attempted placement one additional time. When I was again unable to articulate the forceps, I aborted the procedure and counseled the patient regarding vacuum assisted vaginal delivery as documented above. She consented to proceed with the procedure. A kiwi vacuum was placed approximately 2cm anterior to the posterior fornix.  The patient pushed with vacuum assistance through one contraction with one pop-off. Right mediolateral episiotomy was cut. Vacuum was replaced and the patient pushed through additional contraction for delivery of the fetal head in OA. The vacuum was removed. The fetal head restituted to face the maternal right. Cord delivered behind the fetal back and neck but was not wrapped around the fetus. The head did not deliver with gentle traction and a shoulder dystocia was diagnosed, the patient was instructed to stop pushing, and additional assistance was called to the room. The following maneuvers were employed to resolve the dystocia: maternal hips were hyperflexed; suprapubic pressure was applied; I attempted to delivery posterior arm but was unable to do so; pressure was applied to the back surface of the posterior fetal shoulder to rotate it; pressure was applied to the posterior surface of the anterior fetal shoulder to rotate it; the posterior fetal shoulder was encircled and traction was applied along the sacral curve until posterior shoulder was delivered. The anterior shoulder then delivered and the rest of the infant delivered easily. Total time of shoulder dystocia: 2 minutes. The infant was placed on the maternal abdomen, the cord was clamped and cut, and the infant was handed to the waiting pediatric staff. The placenta was removed and appeared intact. A laceration was noted along the right anterior vagina and this was repaired with 3-0 Vicryl. A third degree perineal laceration was noted and repaired using 2 - 0 Vicryl. A superficial laceration was noted on the surface of the right labia minora and this was repaired with 4-0 Vicryl. Ancef 2g was administered. The vagina and cervix were examined and found to be intact except as noted above. The pediatrician reported that baby was moving both arms equally and no evidence of injury. I discussed the delivery with patient and father of baby and all questions answered. Mother and baby doing well in [facility].     [facility]  April

## 2021-04-27 NOTE — PROGRESS NOTES
LATE ENTRY    Notified of several severe range BP earlier, most recent /102. Pt shaking at time of check. Recheck 134/72. Asked to be notified of additional severe range BP.      Vince Peres

## 2021-04-27 NOTE — ANESTHESIA POSTPROCEDURE EVALUATION
Department of Anesthesiology  Postprocedure Note    Patient: Janee Zimmerman  MRN: [id number]  [place]: 1990  Date of evaluation: 4/27/2021  Time:  3:26 PM     Procedure Summary     Date: 04/26/21 Room / Location:     Anesthesia Start: 2319 Anesthesia Stop: 04/27/21 1312    Procedure: Labor Analgesia Diagnosis:     Scheduled Providers:  Responsible Provider: Olga Lidia Viera MD    Anesthesia Type: epidural ASA Status: 2          Anesthesia Type: epidural    Joseline Phase I: Joseline Score: 9    Joseline Phase II:      Last vitals: Reviewed and per EMR flowsheets.        Anesthesia Post Evaluation    Patient location during evaluation: floor  Patient participation: complete - patient participated  Level of consciousness: awake and alert  Pain score: 0  Nausea & Vomiting: no nausea and no vomiting  Complications: no  Cardiovascular status: hemodynamically stable  Respiratory status: acceptable  Hydration status: stable

## 2021-04-27 NOTE — FLOWSHEET NOTE
Patient reporting pain has decreased to 5/10 which she says is tolerable. She is sitting up feeding the baby and states she would not have been able to do that earlier when she was a 9/10. Educated her to notify her nurse right away if it starts to get intolerable again. She verbalized understanding. She has ordered dinner and has no other needs at this time. FOB at bedside, call light within reach.

## 2021-04-27 NOTE — FLOWSHEET NOTE
Dr. Lexy Lee on unit notified of patients elevated BP's during recovery. Informed her patients pain complaint of pain, pain interventions recently completed, patients chills, etc.  Verbal order received to recheck in 15 minutes and notify her of result.

## 2021-04-27 NOTE — PROGRESS NOTES
Department of Obstetrics and Gynecology  Intrapartum Evaluation Note    Subjective:  Pt in shower. Managing pain of contractions well. Pitocin remains at 6mius. Objective:  /81   Pulse 67   Temp 98.3 °F (36.8 °C) (Oral)   Resp 20   Cervix is 2/80/0 at last exam following SROM.    FHR tracing description: 145/mod/+accels, no decels  FHR tracing category 1  [place]: contractions every 2-3 minutes      Assessment and Plan:  IOL at term   SROM- thin mec     Labor Plan: Continue pitocin titration         Electronically signed by CRAIG Kent CNM on 4/26/2021 at 10:43 PM

## 2021-04-27 NOTE — LACTATION NOTE
Lactation Progress Note  Initial Consult     Data: Referral received per RN.      Action: LC to room white board updated with name and number. Mother resting in bed. Infant awake at breast nursing. Mother states agreeable to consult from [facility] at this time.      Mother verbalized infant latching immediately at 0. Note sustained rhythmical sucks with swallows at this time. I reviewed Care Plan for First 24 Hours of Life already in patient binder.  Discussed recognizing hunger cues and offering the breast when cues are shown. Encouraged breastfeeding on demand and attempting/offering at least every 3 hours. Informed infant may have one 5 hour stretch of sleep in a 24 hour period.  Encouraged unlimited skin to skin contact with infant and reviewed benefits including better temperature, heart rate, respiration, blood pressure, and blood sugar regulation. Also increased bonding and milk supply associated with skin to skin contact. Discussed feeding positions, latch on techniques, signs of milk transfer, output goals and normal feeding/sleeping behaviors.  I referred mother to binder for additional information about breastfeeding and skin to skin contact.      Mother states goal for breastfeeding is one year.     Mother requesting LC to obtain a breast pump through [employer] insurance. I faxed a Prescription from her provider and health insurance information to Mom[employer] per mother's request.      Gave resources for reverse pressure softening and breastfeeding support after discharge. Provided a lactation consultant business card, directed mother to [employer] for evidence based information, and encouraged mother to call with any lactation needs.     Response:  Mother verbalizes understanding of information given and denies further needs at this time.

## 2021-04-27 NOTE — ANESTHESIA PROCEDURE NOTES
Difficulties/Complications: None    The patient was returned to the supine position with her head of bed elevated 30 degrees and right uterine displacement. She tolerated the epidural placement and dosing well. RN remains at bedside to monitor patient.       Keisha Ruth APRN - CRNA  11:45 PM

## 2021-04-28 LAB
HCT VFR BLD CALC: 23.1 % (ref 36–48)
HEMOGLOBIN: 7.7 G/DL (ref 12–16)
MCH RBC QN AUTO: 27 PG (ref 26–34)
MCHC RBC AUTO-ENTMCNC: 33.3 G/DL (ref 31–36)
MCV RBC AUTO: 81.2 FL (ref 80–100)
PDW BLD-RTO: 14.4 % (ref 12.4–15.4)
PLATELET # BLD: 136 K/UL (ref 135–450)
PMV BLD AUTO: 9.4 FL (ref 5–10.5)
RBC # BLD: 2.85 M/UL (ref 4–5.2)
WBC # BLD: 17.6 K/UL (ref 4–11)

## 2021-04-28 PROCEDURE — 6370000000 HC RX 637 (ALT 250 FOR IP): Performed by: OBSTETRICS & GYNECOLOGY

## 2021-04-28 PROCEDURE — 1220000000 HC SEMI PRIVATE OB R&B

## 2021-04-28 PROCEDURE — 6370000000 HC RX 637 (ALT 250 FOR IP): Performed by: ADVANCED PRACTICE MIDWIFE

## 2021-04-28 PROCEDURE — 85027 COMPLETE CBC AUTOMATED: CPT

## 2021-04-28 RX ORDER — FERROUS SULFATE TAB EC 324 MG (65 MG FE EQUIVALENT) 324 (65 FE) MG
324 TABLET DELAYED RESPONSE ORAL
Status: DISCONTINUED | OUTPATIENT
Start: 2021-04-28 | End: 2021-04-29 | Stop reason: HOSPADM

## 2021-04-28 RX ORDER — SENNA AND DOCUSATE SODIUM 50; 8.6 MG/1; MG/1
1 TABLET, FILM COATED ORAL 2 TIMES DAILY
Status: DISCONTINUED | OUTPATIENT
Start: 2021-04-28 | End: 2021-04-29 | Stop reason: HOSPADM

## 2021-04-28 RX ADMIN — IBUPROFEN 800 MG: 800 TABLET, FILM COATED ORAL at 23:44

## 2021-04-28 RX ADMIN — ACETAMINOPHEN 650 MG: 325 TABLET ORAL at 08:02

## 2021-04-28 RX ADMIN — SENNOSIDES AND DOCUSATE SODIUM 1 TABLET: 8.6; 5 TABLET ORAL at 18:30

## 2021-04-28 RX ADMIN — OXYCODONE 5 MG: 5 TABLET ORAL at 23:44

## 2021-04-28 RX ADMIN — FERROUS SULFATE TAB EC 324 MG (65 MG FE EQUIVALENT) 324 MG: 324 (65 FE) TABLET DELAYED RESPONSE at 15:26

## 2021-04-28 RX ADMIN — OXYCODONE 5 MG: 5 TABLET ORAL at 16:30

## 2021-04-28 RX ADMIN — OXYCODONE 5 MG: 5 TABLET ORAL at 11:22

## 2021-04-28 RX ADMIN — OXYCODONE 5 MG: 5 TABLET ORAL at 05:06

## 2021-04-28 RX ADMIN — ACETAMINOPHEN 650 MG: 325 TABLET ORAL at 22:30

## 2021-04-28 RX ADMIN — IBUPROFEN 800 MG: 800 TABLET, FILM COATED ORAL at 15:26

## 2021-04-28 RX ADMIN — ACETAMINOPHEN 650 MG: 325 TABLET ORAL at 18:30

## 2021-04-28 RX ADMIN — ACETAMINOPHEN 650 MG: 325 TABLET ORAL at 14:17

## 2021-04-28 RX ADMIN — IBUPROFEN 800 MG: 800 TABLET, FILM COATED ORAL at 08:02

## 2021-04-28 RX ADMIN — POLYETHYLENE GLYCOL 3350 17 G: 17 POWDER, FOR SOLUTION ORAL at 09:00

## 2021-04-28 ASSESSMENT — PAIN DESCRIPTION - LOCATION
LOCATION: PERINEUM

## 2021-04-28 ASSESSMENT — PAIN DESCRIPTION - ONSET
ONSET: GRADUAL
ONSET: GRADUAL
ONSET: ON-GOING
ONSET: ON-GOING

## 2021-04-28 ASSESSMENT — PAIN DESCRIPTION - ORIENTATION
ORIENTATION: MID

## 2021-04-28 ASSESSMENT — PAIN DESCRIPTION - FREQUENCY
FREQUENCY: INTERMITTENT

## 2021-04-28 ASSESSMENT — PAIN SCALES - GENERAL
PAINLEVEL_OUTOF10: 7
PAINLEVEL_OUTOF10: 2
PAINLEVEL_OUTOF10: 7
PAINLEVEL_OUTOF10: 0
PAINLEVEL_OUTOF10: 4
PAINLEVEL_OUTOF10: 7
PAINLEVEL_OUTOF10: 2
PAINLEVEL_OUTOF10: 6
PAINLEVEL_OUTOF10: 7
PAINLEVEL_OUTOF10: 3
PAINLEVEL_OUTOF10: 5
PAINLEVEL_OUTOF10: 8

## 2021-04-28 ASSESSMENT — PAIN DESCRIPTION - DESCRIPTORS
DESCRIPTORS: ACHING;SORE

## 2021-04-28 ASSESSMENT — PAIN DESCRIPTION - PAIN TYPE
TYPE: ACUTE PAIN

## 2021-04-28 ASSESSMENT — PAIN DESCRIPTION - PROGRESSION
CLINICAL_PROGRESSION: GRADUALLY IMPROVING
CLINICAL_PROGRESSION: GRADUALLY WORSENING

## 2021-04-28 ASSESSMENT — PAIN - FUNCTIONAL ASSESSMENT
PAIN_FUNCTIONAL_ASSESSMENT: ACTIVITIES ARE NOT PREVENTED

## 2021-04-28 NOTE — FLOWSHEET NOTE
Mother is tolerating fluids better and states she voided two more times overnight. This RN documented what was voided. Upon assessment, pt bladder full and fundus deviated due to full bladder. Encouraged pt to attempt to void. She asked for the water to be turned on and sat on the toilet for 15 minutes with no void. Pt returned to bed, then chair and stated she was feeling better. She will attempt to keep voiding. Encouraged her to drink fluids. Pt verbalizes understanding.

## 2021-04-28 NOTE — FLOWSHEET NOTE
Pt called out and stated that she voided 1000ml. RN in room to bladder scan and 105ml shown. Encouraged pt to continue attempting to void. Verbalizes understanding.

## 2021-04-28 NOTE — FLOWSHEET NOTE
Mom awoke while I was checking infant blood glucose and TC bilirubin. Explained need to draw serum bilirubin due to high intermediate risk TC bilirubin. Drawn with glucose and sent to lab.

## 2021-04-28 NOTE — FLOWSHEET NOTE
04/28/21 1224   Encounter Summary   Services provided to: Patient and family together   Referral/Consult From: Other    Support System Spouse   Continue Visiting   (visit and communion 4/28 CL)   Complexity of Encounter Low   Length of Encounter 15 minutes   Spiritual/Scientologist   Type Spiritual support   Assessment Calm; Approachable; Hopeful;Coping   Intervention Active listening;Explored feelings, thoughts, concerns;Prayer;Communion   Outcome Engaged in conversation;Coping; Hopeful   Sacraments   Communion Patient received communion;Family received communion   Electronically signed by Wing Bertrand on 4/28/2021 at 12:25 PM

## 2021-04-28 NOTE — PROGRESS NOTES
Department of Obstetrics and Gynecology  Vaginal Delivery Postpartum Rounds    SUBJECTIVE:  Pain is controlled with Tylenol, non-steroidal anti-inflammatory drugs or narcotic analgesics. Her lochia is normal. +breastfeeding.  +void, but its taking a little while to get going. +flatus. OBJECTIVE:  Vital Signs: /70   Pulse 83   Temp 98.5 °F (36.9 °C) (Oral)   Resp 16   SpO2 100%   Breastfeeding Unknown   Appearance/Psychiatric: awake, alert, cooperative, no apparent distress, appears stated age  Constitutional: The patient is well nourished. Cardiovascular: She does not have edema. Respiratory: Respiratory effort is normal.  Gastrointestinal: Soft, non tender, uterine fundus is firm below umbilicus  Extremities: nontender to palpation  Perineum: repair intact; some edema. No ecchymosis    LABS / IMAGING:  CBC:   Lab Results   Component Value Date    WBC 17.6 04/28/2021    RBC 2.85 04/28/2021    HGB 7.7 04/28/2021    HCT 23.1 04/28/2021    MCV 81.2 04/28/2021    MCH 27.0 04/28/2021    MCHC 33.3 04/28/2021    RDW 14.4 04/28/2021     04/28/2021    MPV 9.4 04/28/2021       ASSESSMENT:    Postpartum Day 1 s/p VAVD   NL involution  Possible urinary retention  Anemia    PLAN:   1. Continue routine postpartum care   2. Start iron daily for anemia  3. Bladder scan after next void  4. Encourage voids q 2-3 hrs  5.  Anticipate D/C tomorrow    Electronically signed by CRAIG Ruiz CNM on 4/28/2021 at 2:42 PM

## 2021-04-28 NOTE — FLOWSHEET NOTE
Patient and spouse updated on CT results and plan of care. Patient up to bathroom, voided 450 blood tinged urine. Encouraged to try to empty bladder at least every 2 hours. Moved with infant to room [id number] in wheelchair. She and  oriented to room. Encouraged to call with questions/concerns. STONE Brandon aware of CT results, OK to leave epidural catheter in place overnight - patient and spouse aware.

## 2021-04-28 NOTE — FLOWSHEET NOTE
Bedside report given to [facility], RN. She is assuming care at this time.

## 2021-04-28 NOTE — FLOWSHEET NOTE
[address] [facility], notified of H&H results. Iron ordered. Sennokot also ordered for patient. Notified of pt fundus deviated due to full bladder. Pt able to void and bladder less full but fundus still remains deviated. [address] [facility] would like RN to bladder scan after next void.

## 2021-04-28 NOTE — FLOWSHEET NOTE
2 minute shoulder dystocia relieved following lowering HOB, So maneuver, suprapubic pressure, and delivery of the posterior shoulder. See delivery summary for details. Vacuum assisted vaginal delivery of viable male infant at this time. Infant pale, with no tone and no respiratory effort, taken immediately to RN's waiting at warmer. Infant care handed off to [facility], RN, [employer], RN, and [employer], RN.

## 2021-04-29 VITALS
RESPIRATION RATE: 16 BRPM | SYSTOLIC BLOOD PRESSURE: 120 MMHG | TEMPERATURE: 98 F | DIASTOLIC BLOOD PRESSURE: 76 MMHG | OXYGEN SATURATION: 100 % | HEART RATE: 71 BPM

## 2021-04-29 PROCEDURE — 6370000000 HC RX 637 (ALT 250 FOR IP): Performed by: ADVANCED PRACTICE MIDWIFE

## 2021-04-29 PROCEDURE — 6370000000 HC RX 637 (ALT 250 FOR IP): Performed by: OBSTETRICS & GYNECOLOGY

## 2021-04-29 RX ORDER — FERROUS SULFATE TAB EC 324 MG (65 MG FE EQUIVALENT) 324 (65 FE) MG
324 TABLET DELAYED RESPONSE ORAL
Qty: 30 TABLET | Refills: 2 | Status: SHIPPED | OUTPATIENT
Start: 2021-04-29

## 2021-04-29 RX ORDER — POLYETHYLENE GLYCOL 3350 17 G/17G
17 POWDER, FOR SOLUTION ORAL DAILY
Qty: 527 G | Refills: 0 | COMMUNITY
Start: 2021-04-30 | End: 2021-05-30

## 2021-04-29 RX ORDER — POLYETHYLENE GLYCOL 3350 17 G/17G
17 POWDER, FOR SOLUTION ORAL DAILY
Qty: 527 G | Refills: 1 | COMMUNITY
Start: 2021-04-30 | End: 2021-05-30

## 2021-04-29 RX ORDER — FERROUS SULFATE TAB EC 324 MG (65 MG FE EQUIVALENT) 324 (65 FE) MG
324 TABLET DELAYED RESPONSE ORAL
Qty: 30 TABLET | COMMUNITY
Start: 2021-04-29

## 2021-04-29 RX ORDER — OXYCODONE HYDROCHLORIDE 5 MG/1
5 TABLET ORAL EVERY 4 HOURS PRN
Qty: 18 TABLET | Refills: 0 | OUTPATIENT
Start: 2021-04-29 | End: 2021-05-02

## 2021-04-29 RX ORDER — IBUPROFEN 800 MG/1
800 TABLET ORAL EVERY 8 HOURS PRN
Qty: 120 TABLET | Refills: 3 | COMMUNITY
Start: 2021-04-29

## 2021-04-29 RX ORDER — SIMETHICONE 80 MG
80 TABLET,CHEWABLE ORAL EVERY 6 HOURS PRN
Qty: 180 TABLET | Refills: 3 | COMMUNITY
Start: 2021-04-29

## 2021-04-29 RX ORDER — LANOLIN 100 %
OINTMENT (GRAM) TOPICAL
Qty: 1 TUBE | Refills: 3 | COMMUNITY
Start: 2021-04-29

## 2021-04-29 RX ORDER — FAMOTIDINE 20 MG/1
20 TABLET, FILM COATED ORAL 2 TIMES DAILY
Qty: 60 TABLET | Refills: 3 | COMMUNITY
Start: 2021-04-29

## 2021-04-29 RX ORDER — SENNA AND DOCUSATE SODIUM 50; 8.6 MG/1; MG/1
1 TABLET, FILM COATED ORAL DAILY
COMMUNITY
Start: 2021-04-29

## 2021-04-29 RX ORDER — IBUPROFEN 800 MG/1
800 TABLET ORAL EVERY 8 HOURS PRN
Refills: 0 | COMMUNITY
Start: 2021-04-29

## 2021-04-29 RX ORDER — OXYCODONE HYDROCHLORIDE 5 MG/1
5 TABLET ORAL EVERY 6 HOURS PRN
Qty: 12 TABLET | Refills: 0 | Status: SHIPPED | OUTPATIENT
Start: 2021-04-29 | End: 2021-05-02

## 2021-04-29 RX ADMIN — IBUPROFEN 800 MG: 800 TABLET, FILM COATED ORAL at 08:17

## 2021-04-29 RX ADMIN — POLYETHYLENE GLYCOL 3350 17 G: 17 POWDER, FOR SOLUTION ORAL at 08:16

## 2021-04-29 RX ADMIN — FAMOTIDINE 20 MG: 20 TABLET, FILM COATED ORAL at 08:17

## 2021-04-29 RX ADMIN — ACETAMINOPHEN 650 MG: 325 TABLET ORAL at 06:15

## 2021-04-29 RX ADMIN — OXYCODONE 5 MG: 5 TABLET ORAL at 14:40

## 2021-04-29 RX ADMIN — FERROUS SULFATE TAB EC 324 MG (65 MG FE EQUIVALENT) 324 MG: 324 (65 FE) TABLET DELAYED RESPONSE at 10:33

## 2021-04-29 RX ADMIN — OXYCODONE 5 MG: 5 TABLET ORAL at 08:18

## 2021-04-29 RX ADMIN — SENNOSIDES AND DOCUSATE SODIUM 1 TABLET: 8.6; 5 TABLET ORAL at 08:17

## 2021-04-29 ASSESSMENT — PAIN SCALES - GENERAL
PAINLEVEL_OUTOF10: 7
PAINLEVEL_OUTOF10: 3
PAINLEVEL_OUTOF10: 5

## 2021-04-29 ASSESSMENT — PAIN DESCRIPTION - DESCRIPTORS: DESCRIPTORS: SORE

## 2021-04-29 ASSESSMENT — PAIN DESCRIPTION - FREQUENCY: FREQUENCY: INTERMITTENT

## 2021-04-29 ASSESSMENT — PAIN - FUNCTIONAL ASSESSMENT: PAIN_FUNCTIONAL_ASSESSMENT: ACTIVITIES ARE NOT PREVENTED

## 2021-04-29 ASSESSMENT — PAIN DESCRIPTION - LOCATION: LOCATION: PERINEUM

## 2021-04-29 ASSESSMENT — PAIN DESCRIPTION - PROGRESSION: CLINICAL_PROGRESSION: GRADUALLY WORSENING

## 2021-04-29 ASSESSMENT — PAIN DESCRIPTION - ORIENTATION: ORIENTATION: MID

## 2021-04-29 ASSESSMENT — PAIN DESCRIPTION - ONSET: ONSET: GRADUAL

## 2021-04-29 ASSESSMENT — PAIN DESCRIPTION - PAIN TYPE: TYPE: ACUTE PAIN

## 2021-04-29 NOTE — FLOWSHEET NOTE

## 2021-04-29 NOTE — LACTATION NOTE
This note was copied from a baby's chart. Lactation Progress Note      Data:    Follow-up     Action:  LC to room and mother agreeable to consult at this time. White board updated with name and number. Mother states breastfeeding going well. Mother asked for information on how to use pump. Pump video started mothers room. Mother also provided pumping \"bra\" for discharge. Mother encouraged to call [facility] for any further needs. Response: Mother denies further needs at this time.

## 2021-05-03 NOTE — ADDENDUM NOTE
Addendum  created 05/03/21 1324 by Charlene Simpson MD    Clinical Note Signed, Cosign clinical note, Intraprocedure Blocks edited